# Patient Record
(demographics unavailable — no encounter records)

---

## 2017-01-02 NOTE — PDOC
History of Present Illness





- General


History Source: Patient


Exam Limitations: No Limitations





- History of Present Illness


Initial Comments: 


01/02/17 20:19


The patient is a 64-year-old female with a significant past medical history of 

hypertension, hypercholesterolemia, asthma, and chronic dyspnea on exertion, 

and presents to the emergency department with intermittent chest pain, 

abdominal pain, and generalized weakness for the last several weeks. She states 

that the chest pain is a pressure-like sensation, localized to the mid-sternum, 

without radiation. She states the pain upon arrival to the ED is not as severe 

as it is usually. She reports generalized weakness today. She reports 

occasional dysuria for the last several days. The patient denies fever, chills, 

nausea, vomit, diarrhea, constipation, and loss of appetite. The patient denies 

headache, dizziness, and palpitations. The patient denies frequency, urgency 

and hematuria. She denies recent sick contacts. She traveled to [place] 2 weeks 

ago. 





Allergies: No known drug allergies


Past Surgical History: Hysterectomy


Social History: Denies smoking, ETOH, and recreational drug use


PCP: Dr. Heraclio Lopez





<Khushboo Nicolas - Last Filed: 01/02/17 20:19>





- General


History Source: Patient





<PoolBobby fuentes - Last Filed: 01/03/17 00:12>





- General


Chief Complaint: Chest Pain


Stated Complaint: ABP PAIN/CHEST PAIN


Time Seen by Provider: 01/02/17 19:39





Past History





<Khushboo Nicolas - Last Filed: 01/02/17 20:19>





- Past Medical History


Asthma: Yes


HTN: Yes


Hypercholesterolemia: Yes





- Surgical History


Abdominal Surgery: No


Appendectomy: No


Cardiac Surgery: No


Cholecystectomy: No


Gastric Stapling: No


GI Surgery: No


Lung Surgery: No


Neurologic Surgery: No


Orthopedic Surgery: No





- Immunization History


Immunization Up to Date: No





- Psycho/Social/Smoking Cessation Hx


Anxiety: No


Suicidal Ideation: No


Smoking Status: No


Smoking History: Never smoked


Have you smoked in the past 12 months: No


Number of Cigarettes Smoked Daily: 0


Information on smoking cessation initiated: No


Hx Alcohol Use: No


Drug/Substance Use Hx: No


Substance Use Type: None


Hx Substance Use Treatment: No





<Bobby Smith - Last Filed: 01/03/17 00:12>





- Past Medical History


Allergies/Adverse Reactions: 


 Allergies











Allergy/AdvReac Type Severity Reaction Status Date / Time


 


No Known Allergies Allergy   Verified 01/02/17 19:43











Home Medications: 


Ambulatory Orders





Aspirin 81 mg PO DAILY 05/11/13 


Losartan 50Mg/Hctz 12.5MG [Hyzaar -] 1 tab PO DAILY 12/07/16 


Ondansetron [Zofran *Odt*] 4 mg SL TID #30 od.tablet 01/03/17 


Pantoprazole Sodium [Protonix] 40 mg PO DAILY #30 tablet. 01/03/17 











**Review of Systems





- Review of Systems


Able to Perform ROS?: Yes


Comments:: 





01/02/17 20:20


CONSTITUTIONAL:


Present: +generalized weakness


Absent: fever, chills, diaphoresis, malaise, loss of appetite


HEENT:


Absent: rhinorrhea, nasal congestion, throat pain, throat swelling, difficulty 

swallowing,


mouth swelling, ear pain, eye pain, visual changes


CARDIOVASCULAR:


Present: +chest pain


Absent: syncope, palpitations, irregular heart rate, lightheadedness, peripheral


edema


RESPIRATORY:


Absent: cough, shortness of breath, dyspnea with exertion, orthopnea, wheezing, 

stridor,


hemoptysis


GASTROINTESTINAL:


Present: +abdominal pain


Absent: abdominal distension, nausea, vomiting, diarrhea, constipation,


melena, hematochezia


GENITOURINARY:


Present: +dysuria


Absent: frequency, urgency, hesitancy, hematuria, flank pain, genital pain


MUSCULOSKELETAL:


Absent: myalgia, arthralgia, joint swelling


SKIN:


Absent: rash, itching, pallor


HEMATOLOGIC/IMMUNOLOGIC:


Absent: easy bleeding, easy bruising, lymphadenopathy, frequent infections


ENDOCRINE:


Absent: unexplained weight gain, unexplained weight loss, heat intolerance, 

cold intolerance


NEUROLOGIC:


Absent: headache, focal weakness or paresthesias, dizziness, unsteady gait, 

seizure, mental


status changes, bladder or bowel incontinence


PSYCHIATRIC:


Absent: anxiety, depression, suicidal or homicidal ideation, hallucinations.





<Khushboo Nicolas - Last Filed: 01/02/17 20:19>





*Physical Exam





- Vital Signs


 Last Vital Signs











Temp Pulse Resp BP Pulse Ox


 


 98 F   74   18   148/85   100 


 


 01/02/17 18:46  01/02/17 18:46  01/02/17 18:46  01/02/17 18:46  01/02/17 18:46














- Physical Exam


Comments: 





01/02/17 20:21


GENERAL:


Well developed, well nourished. Awake and alert. No acute distress.


HEENT:


Normocephalic, atraumatic. PERRLA, EOMI. No conjunctival pallor. Sclera are non-

icteric.


Moist mucous membranes. Oropharynx is clear.


NECK:


Supple. Full ROM. No JVD. Carotid pulses 2+ and symmetric, without bruits. No


thyromegaly. No lymphadenopathy.


CARDIOVASCULAR:


Regular rate and rhythm. No murmurs, rubs, or gallops. Distal pulses are 2+ and


symmetric.


PULMONARY:


No evidence of respiratory distress. Lungs clear to auscultation bilaterally. 

No wheezing,


rales or rhonchi.


ABDOMINAL:


Soft. Non-tender. Non-distended. No rebound or guarding. No organomegaly. 

Normoactive


bowel sounds.


MUSCULOSKELETAL


Normal range of motion at all joints. No bony deformities or tenderness. No CVA


tenderness.


EXTREMITIES:


No cyanosis. No clubbing. No edema. No calf tenderness.


SKIN:


Warm and dry. Normal capillary refill. No rashes. No jaundice.


NEUROLOGICAL:


Alert, awake, appropriate. Cranial nerves 2-12 intact. No deficits to light 

touch and


temperature in face, upper extremities and lower extremities. No motor deficits 

in the in


face, upper extremities and lower extremities. Normoreflexic in the upper and 

lower


extremities. Normal speech. Toes are down-going bilaterally. Gait is normal 

without ataxia.


PSYCHIATRIC:


Cooperative. Good eye contact. Appropriate mood and affect.








<Khushboo Nicolas - Last Filed: 01/02/17 20:19>





- Vital Signs


 Last Vital Signs











Temp Pulse Resp BP Pulse Ox


 


 98 F   74   18   148/85   100 


 


 01/02/17 18:46  01/02/17 18:46  01/02/17 18:46  01/02/17 18:46  01/02/17 18:46














<Bobby Smith - Last Filed: 01/03/17 00:12>





ED Treatment Course





- LABORATORY


CBC & Chemistry Diagram: 


 01/02/17 19:40





 01/02/17 19:40





- ADDITIONAL ORDERS


Additional order review: 


 Laboratory  Results











  01/02/17





  19:40


 


INR  1.02








 











  01/02/17





  19:40


 


RBC  4.35


 


MCV  80.6


 


MCHC  32.3


 


RDW  15.0


 


MPV  8.0


 


Neutrophils %  35.7 L


 


Lymphocytes %  50.8 H


 


Monocytes %  9.0


 


Eosinophils %  3.4


 


Basophils %  1.1














<Khushboo Nicolas - Last Filed: 01/02/17 20:19>





- LABORATORY


CBC & Chemistry Diagram: 


 01/02/17 19:40





 01/02/17 19:40





- RADIOLOGY


Radiology Studies Ordered: 














 Category Date Time Status


 


 CHEST X-RAY PORTABLE* [RAD] [facility]  01/02/17 19:30 Ordered














<Bobby Smith - Last Filed: 01/03/17 00:12>





Medical Decision Making





- Medical Decision Making





01/03/17 00:10


Dr. Smith: The scribe's documentation has been prepared under my direction 

and personally reviewed by me in its entirery. I confirm that the note above 

accurately reflects all work, treatment, procedures, and medical decision 

making performed by me.








Pt feels better.  Labs are all normal.  Pt advised to follow up with her pcp 

for further evaluation





<Bobby Smith - Last Filed: 01/03/17 00:12>





*DC/Admit/Observation/Transfer





- Attestations


Scribe Attestion: 





01/02/17 20:22


Documentation prepared by Khushboo Nicolas, acting as medical scribe for Bobby Smith DO.





<Khushboo Nicolas - Last Filed: 01/02/17 20:19>





- Discharge Dispostion


Admit: No





<Bobby Smith - Last Filed: 01/03/17 00:12>


Diagnosis at time of Disposition: 


Chest pain


Qualifiers:


 Chest pain type: unspecified Qualified Code(s): R07.9 - Chest pain, unspecified





Abdominal pain


Qualifiers:


 Abdominal location: generalized Qualified Code(s): R10.84 - Generalized 

abdominal pain





- Discharge Dispostion


Disposition: HOME


Condition at time of disposition: Stable





- Prescriptions


Prescriptions: 


Pantoprazole Sodium [Protonix] 40 mg PO DAILY #30 tablet.


Ondansetron [Zofran *Odt*] 4 mg SL TID #30 od.tablet





- Referrals


Referrals: 


Heraclio Lopez MD [Primary Care Provider] - 





- Patient Instructions


Printed Discharge Instructions:  DI for Chest Pain, DI for Abdominal Pain-Adult


Additional Instructions: 


Please continue taking all your medications as directed.  Follow up with your 

primary care  doctor by tomorrow

## 2017-01-03 NOTE — EKG
Test Reason : 

Blood Pressure : ***/*** mmHG

Vent. Rate : 067 BPM     Atrial Rate : 067 BPM

   P-R Int : 170 ms          QRS Dur : 074 ms

    QT Int : 406 ms       P-R-T Axes : 031 -18 145 degrees

   QTc Int : 429 ms

 

NORMAL SINUS RHYTHM

POSSIBLE LEFT ATRIAL ENLARGEMENT

INFERIOR INFARCT , AGE UNDETERMINED

T WAVE ABNORMALITY, CONSIDER LATERAL ISCHEMIA

ABNORMAL ECG

WHEN COMPARED WITH ECG OF 07-DEC-2016 11:43,

T WAVE VARIATION

Confirmed by ABRAM ROBISON, MARISOL ([id number]) on 1/3/2017 9:51:33 AM

 

Referred By:             Overread By: MARISOL VALVERDE MD

## 2017-07-31 NOTE — PDOC
History of Present Illness





- General


Chief Complaint: Headache


Stated Complaint: HEAD PAIN


Time Seen by Provider: 07/31/17 19:39





- History of Present Illness


Initial Comments: 


07/31/17 19:57


Ms. Barreto is a 65 year old female with a significant past medical history of HTN

, Hypercholesterolemia, and self reported borderline DMII who presents to the 

emergency department with 2 day history of "a terrible headache





The patient denies chest pain, shortness of breath, headache and dizziness. 

Denies fever, chills, nausea, vomit, diarrhea and constipation. Denies dysuria, 

frequency, urgency and hematuria.





Allergies:NKDA


Past surgical history:Thyroid, hysterectomy, reported bladder tack/lift for 

urinary leakage


Social history: denies EtOH, tobacco, illicits


PMD - Heraclio Lopez








07/31/17 20:27








Past History





- Past Medical History


Allergies/Adverse Reactions: 


 Allergies











Allergy/AdvReac Type Severity Reaction Status Date / Time


 


No Known Allergies Allergy   Verified 07/31/17 19:10











Home Medications: 


Ambulatory Orders





Aspirin 81 mg PO DAILY 05/11/13 


Losartan 50Mg/Hctz 12.5MG [Hyzaar -] 1 tab PO DAILY 12/07/16 


Gabapentin 300 mg PO DAILY 07/31/17 


Nitrofurantoin Monohyd/M-Cryst [Macrobid -] 100 mg PO BID #6 capsule 08/01/17 








Asthma: Yes


HTN: Yes


Hypercholesterolemia: Yes





- Surgical History


Abdominal Surgery: No


Appendectomy: No


Cardiac Surgery: No


Cholecystectomy: No


Gastric Stapling: No


GI Surgery: No


Lung Surgery: No


Neurologic Surgery: No


Orthopedic Surgery: No





- Immunization History


Immunization Up to Date: Yes





- Psycho/Social/Smoking Cessation Hx


Anxiety: No


Suicidal Ideation: No


Smoking Status: No


Smoking History: Never smoked


Have you smoked in the past 12 months: No


Number of Cigarettes Smoked Daily: 0


Information on smoking cessation initiated: No


Hx Alcohol Use: No


Drug/Substance Use Hx: No


Substance Use Type: None


Hx Substance Use Treatment: No





**Review of Systems





- Review of Systems


Comments:: 


07/31/17 19:57


GENERAL/CONSTITUTIONAL: No fever or chills. No weakness.


HEAD, EYES, EARS, NOSE AND THROAT: +"Terrible" headache reported in L front of 

head. No change in vision. No ear pain or discharge. No sore throat.


CARDIOVASCULAR: No chest pain or shortness of breath


RESPIRATORY: No cough, wheezing, or hemoptysis.


GASTROINTESTINAL: +Some pain reported to R lower back with deep inspiration. No 

nausea, vomiting, diarrhea or constipation.


GENITOURINARY: No dysuria, frequency, or change in urination.


MUSCULOSKELETAL: No joint or muscle swelling or pain. No neck or back pain.


SKIN: No rash


NEUROLOGIC: No headache, vertigo, loss of consciousness, or change in strength/

sensation.


ENDOCRINE: No increased thirst. No abnormal weight change


HEMATOLOGIC/LYMPHATIC: No anemia, easy bleeding, or history of blood clots.


ALLERGIC/IMMUNOLOGIC: No hives or skin allergy.





08/01/17 00:29








*Physical Exam





- Vital Signs


 Last Vital Signs











Temp Pulse Resp BP Pulse Ox


 


 97.9 F   70   17   150/87   99 


 


 07/31/17 19:10  07/31/17 19:10  07/31/17 19:10  07/31/17 19:10  07/31/17 19:10














- Physical Exam


Comments: 


07/31/17 19:57


GENERAL: +In obvious pain, eyes watering. Awake, alert, and fully oriented, in 

no acute distress


HEAD: No signs of trauma, normocephalic, atraumatic


EYES: +Eyes bloodshot. PERRLA, EOMI, conjunctiva clear


ENT: Auricles normal inspection, hearing grossly normal, nares patent, 

oropharynx clear without


exudates. Moist mucosa


NECK: Normal ROM, supple, no lymphadenopathy, JVD, or masses


LUNGS: No distress, speaks full sentences, clear to auscultation bilaterally


HEART: Regular rate and rhythm, normal S1 and S2, no murmurs, rubs or gallops, 

peripheral pulses normal and equal bilaterally.


ABDOMEN: +Slight R CVA tenderness. Soft, nontender, normoactive bowel sounds.  

No guarding, no rebound.  No masses


EXTREMITIES: Normal inspection, Normal range of motion, no edema.  No clubbing 

or cyanosis.


NEUROLOGICAL: Cranial nerves II through XII grossly intact.  Normal speech, 

normal gait, no focal sensorimotor deficits


SKIN: Warm, Dry, normal turgor, no rashes or lesions noted.








07/31/17 22:20








08/01/17 00:29








ED Treatment Course





- LABORATORY


CBC & Chemistry Diagram: 


 07/31/17 20:20





 07/31/17 20:20





Medical Decision Making





- Medical Decision Making


07/31/17 20:27





Ms. Estevan presents with severe headache for 24 hours. Hx of DM and Thyroid 

problems. CMP/CBC/TSH/EKG done - no concerning values. 975 of tylenol brought 

some relief. UA pending. Patient given reglan and benadryl for symptomatic 

relief.





Leukocyte esterase 2+ in urine








08/01/17 00:23


Patient reports resolution of pain. Will d/c to home with Rx to treat suspected 

uti.





*DC/Admit/Observation/Transfer


Diagnosis at time of Disposition: 


Headache


Qualifiers:


 Headache type: unspecified Headache chronicity pattern: acute headache 

Intractability: not intractable Qualified Code(s): R51 - Headache





- Discharge Dispostion


Disposition: HOME


Condition at time of disposition: Stable





- Prescriptions


Prescriptions: 


Nitrofurantoin Monohyd/M-Cryst [Macrobid -] 100 mg PO BID #6 capsule





- Referrals


Referrals: 


Heraclio Lopez MD [Primary Care Provider] - 





- Patient Instructions


Additional Instructions: 


Take all antibiotics as ordered. Please return if  you experience continued or 

increased pain or other concerning symptoms.





- Attestations


Physician Attestion: 





08/01/17 00:28








I, Dr. Jesus Miranda, attest that this document has been prepared under my 

direction and personally reviewed by me in its entirety.   I further attest, 

that it accurately reflects all work, treatment, procedures and medical decision

-making performed by me.

## 2017-07-31 NOTE — PDOC
Attending Attestation





- Resident


Resident Name: Jesus Miranda





- HPI


HPI: 





08/01/17 00:20


pt has had a headache not relieved with tylenol. no fever,no visual changes,no 

neck pain ,no vomiting





- Physicial Exam


PE: 





08/01/17 00:20


wnwd 64 yo female


heent eyes antelmo eomi


neck no bruits


lungs cta b.l


abd nontender,no guarding


neuro axox3,motor strength 5/5,no ataxia,no facial droop ,no speech 

difficulties 








- Medical Decision Making





08/01/17 00:23


pt's headache relieved and want to go home now

## 2017-08-01 NOTE — EKG
Test Reason : 

Blood Pressure : ***/*** mmHG

Vent. Rate : 052 BPM     Atrial Rate : 052 BPM

   P-R Int : 176 ms          QRS Dur : 076 ms

    QT Int : 466 ms       P-R-T Axes : 040 011 057 degrees

   QTc Int : 433 ms

 

SINUS BRADYCARDIA

LEFT ATRIALABNORMALITY

WHEN COMPARED WITH ECG OF 02-JAN-2017 18:51,

CRITERIA FOR INFERIOR INFARCT ARE NO LONGER PRESENT

NONSPECIFIC T WAVE ABNORMALITY HAS REPLACED INVERTED T WAVES IN

REPEAT EKG IF CLINICALLY INDICATED

Confirmed by QUETA NOBLES MD ([id number]) on 8/1/2017 12:28:09 PM

 

Referred By:             Confirmed By:QUETA NOBLES MD